# Patient Record
Sex: MALE | Race: OTHER | Employment: UNEMPLOYED | URBAN - METROPOLITAN AREA
[De-identification: names, ages, dates, MRNs, and addresses within clinical notes are randomized per-mention and may not be internally consistent; named-entity substitution may affect disease eponyms.]

---

## 2023-12-31 ENCOUNTER — HOSPITAL ENCOUNTER (EMERGENCY)
Facility: HOSPITAL | Age: 28
Discharge: HOME | End: 2023-12-31
Attending: EMERGENCY MEDICINE | Admitting: EMERGENCY MEDICINE
Payer: COMMERCIAL

## 2023-12-31 VITALS
HEART RATE: 89 BPM | OXYGEN SATURATION: 99 % | TEMPERATURE: 98.1 F | DIASTOLIC BLOOD PRESSURE: 85 MMHG | RESPIRATION RATE: 16 BRPM | SYSTOLIC BLOOD PRESSURE: 137 MMHG

## 2023-12-31 DIAGNOSIS — H60.91 RIGHT OTITIS EXTERNA: Primary | ICD-10-CM

## 2023-12-31 PROCEDURE — 99282 EMERGENCY DEPT VISIT SF MDM: CPT

## 2023-12-31 PROCEDURE — 99284 EMERGENCY DEPT VISIT MOD MDM: CPT | Performed by: EMERGENCY MEDICINE

## 2023-12-31 RX ORDER — ACETAMINOPHEN 325 MG/1
650 TABLET ORAL ONCE
Status: COMPLETED | OUTPATIENT
Start: 2023-12-31 | End: 2023-12-31

## 2023-12-31 RX ORDER — CIPROFLOXACIN AND DEXAMETHASONE 3; 1 MG/ML; MG/ML
4 SUSPENSION/ DROPS AURICULAR (OTIC) 2 TIMES DAILY
Status: DISCONTINUED | OUTPATIENT
Start: 2023-12-31 | End: 2023-12-31 | Stop reason: HOSPADM

## 2023-12-31 RX ORDER — IBUPROFEN 400 MG/1
800 TABLET ORAL ONCE
Status: COMPLETED | OUTPATIENT
Start: 2023-12-31 | End: 2023-12-31

## 2023-12-31 RX ADMIN — CIPROFLOXACIN AND DEXAMETHASONE 4 DROP: 3; 1 SUSPENSION/ DROPS AURICULAR (OTIC) at 11:38

## 2023-12-31 RX ADMIN — IBUPROFEN 800 MG: 400 TABLET, FILM COATED ORAL at 11:34

## 2023-12-31 RX ADMIN — ACETAMINOPHEN 650 MG: 325 TABLET, FILM COATED ORAL at 11:34

## 2023-12-31 NOTE — ED PROVIDER NOTES
History  Chief Complaint   Patient presents with    Earache     Pain in right ear starting yesterday      28-year-old male presents to the emergency department for evaluation of right ear pain.  Patient speaks predominantly [place] and has his brother with him for interpretation.  Patient states that he is having pain localized to the right inner ear with some pain with touching the outside of the ear.  He denies pain or swelling behind the right ear.  He does feel that his hearing is muffled.  No reported fevers or chills.  No drainage from the ear.  No recent water exposure or swelling.      History provided by:  Patient and relative   used: Yes    Earache  Location:  Right  Behind ear:  No abnormality  Quality:  Pressure and sore  Severity:  Severe  Onset quality:  Gradual  Duration:  2 days  Timing:  Constant  Progression:  Worsening  Chronicity:  New  Context: not foreign body in ear and not recent URI    Relieved by:  Nothing  Worsened by:  Nothing  Ineffective treatments:  OTC medications  Associated symptoms: no diarrhea, no ear discharge, no fever, no sore throat and no tinnitus    Associated symptoms comment:  Muffled hearing      None       Past Medical History:   Diagnosis Date    Seizures (HCC)        History reviewed. No pertinent surgical history.    History reviewed. No pertinent family history.  I have reviewed and agree with the history as documented.    E-Cigarette/Vaping    E-Cigarette Use Never User      E-Cigarette/Vaping Substances     Social History     Tobacco Use    Smoking status: Never    Smokeless tobacco: Never   Vaping Use    Vaping status: Never Used   Substance Use Topics    Alcohol use: Never       Review of Systems   Constitutional:  Negative for fever.   HENT:  Positive for ear pain. Negative for ear discharge, sinus pressure, sore throat and tinnitus.    Gastrointestinal:  Negative for diarrhea.   All other systems reviewed and are negative.      Physical  Exam  Physical Exam  Vitals and nursing note reviewed.   Constitutional:       General: He is in acute distress.      Appearance: He is well-developed. He is not ill-appearing or toxic-appearing.   HENT:      Head: Normocephalic.      Right Ear: External ear normal. Swelling and tenderness present. No mastoid tenderness. Tympanic membrane is injected.      Left Ear: Tympanic membrane and external ear normal.      Ears:      Comments: Heaped up debris in the right external auditory canal.  TM only partially visualized due to significant swelling of the canal.     Nose: Nose normal.      Mouth/Throat:      Mouth: Mucous membranes are moist.      Pharynx: Oropharynx is clear.   Eyes:      General: Lids are normal.      Extraocular Movements: Extraocular movements intact.      Pupils: Pupils are equal, round, and reactive to light.   Pulmonary:      Effort: Pulmonary effort is normal. No respiratory distress.   Musculoskeletal:         General: No deformity. Normal range of motion.      Cervical back: Normal range of motion and neck supple.   Skin:     General: Skin is warm and dry.   Neurological:      Mental Status: He is alert and oriented to person, place, and time.   Psychiatric:         Mood and Affect: Mood normal.         Vital Signs  ED Triage Vitals [12/31/23 1050]   Temperature Pulse Respirations Blood Pressure SpO2   98.1 °F (36.7 °C) 89 16 137/85 99 %      Temp Source Heart Rate Source Patient Position - Orthostatic VS BP Location FiO2 (%)   Oral Monitor -- Right arm --      Pain Score       10 - Worst Possible Pain           Vitals:    12/31/23 1050   BP: 137/85   Pulse: 89         Visual Acuity      ED Medications  Medications   acetaminophen (TYLENOL) tablet 650 mg (650 mg Oral Given 12/31/23 1134)   ibuprofen (MOTRIN) tablet 800 mg (800 mg Oral Given 12/31/23 1134)       Diagnostic Studies  Results Reviewed       None                   No orders to display              Procedures  Procedures         ED  Course                                             Medical Decision Making  28-year-old male presents with right ear pain.  Differential diagnosis includes but is not limited to acute otitis media, otitis externa, mastoiditis, cellulitis    Problems Addressed:  Right otitis externa: acute illness or injury    Amount and/or Complexity of Data Reviewed  Independent Historian:      Details: Bedside help provide history and translate  Discussion of management or test interpretation with external provider(s): A small otic wick was placed by me, first 4 drops placed by me.  Patient and family instructed how to continue using the drops and instructed on how to remove wick in 24 to 48 hours.    Risk  OTC drugs.  Prescription drug management.  Risk Details: Patient presents with ear pain, patient has acute otitis externa.  Will treat with topical antibiotics.  Will refer to ENT if symptoms or not improving.  Discussed signs and symptoms to return to the emergency department.             Disposition  Final diagnoses:   Right otitis externa     Time reflects when diagnosis was documented in both MDM as applicable and the Disposition within this note       Time User Action Codes Description Comment    12/31/2023 11:50 AM Jenni Falk Add [H60.91] Right otitis externa           ED Disposition       ED Disposition   Discharge    Condition   Stable    Date/Time   Sun Dec 31, 2023 11:50 AM    Comment   Mateo Remy discharge to home/self care.                   Follow-up Information       Follow up With Specialties Details Why Contact Info    Michael Raines MD Otolaryngology Schedule an appointment as soon as possible for a visit  For recheck of current symptoms, As needed [address] [facility]  [address]  [facility] [address] [phone]              There are no discharge medications for this patient.      No discharge procedures on file.    PDMP Review       None            ED Provider  Electronically Signed by              Jenni Falk,   01/03/24 1128